# Patient Record
Sex: MALE | Race: WHITE | NOT HISPANIC OR LATINO | Employment: OTHER | URBAN - METROPOLITAN AREA
[De-identification: names, ages, dates, MRNs, and addresses within clinical notes are randomized per-mention and may not be internally consistent; named-entity substitution may affect disease eponyms.]

---

## 2017-10-26 DIAGNOSIS — E78.5 HYPERLIPIDEMIA: ICD-10-CM

## 2017-10-26 DIAGNOSIS — Z12.5 ENCOUNTER FOR SCREENING FOR MALIGNANT NEOPLASM OF PROSTATE: ICD-10-CM

## 2017-10-30 ENCOUNTER — ALLSCRIPTS OFFICE VISIT (OUTPATIENT)
Dept: OTHER | Facility: OTHER | Age: 77
End: 2017-10-30

## 2018-01-13 NOTE — MISCELLANEOUS
History of Present Illness  TCM Communication [facility]: The patient is being contacted for follow-up after hospitalization and NEEDS APPT, PT CANCELLED JOE  He was hospitalized at [facility]  The date of discharge: 1/11/16  Diagnosis: ACUTE PYELONEPHRITIS  Medications reviewed and updated today  He did not schedule a follow up appointment  Follow-up appointments with other specialists: UROLOGY AND CARDS F/U  Counseling was provided to patient's family  SPOKE TO WIFE, DOING FAIR, GETTING IV ABX AT HOME  Communication performed and completed by JOY MALHOTRA      Active Problems    1  Atrial fibrillation (427 31) (I48 91)   2  Cellulitis of leg (682 6) (L03 119)   3  Complete tear of rotator cuff, unspecified laterality (727 61) (M75 120)   4  Depression (311) (F32 9)   5  Edema (782 3) (R60 9)   6  Esophageal reflux (530 81) (K21 9)   7  Fatigue (780 79) (R53 83)   8  Generalized osteoarthritis (715 00) (M15 9)   9  Hyperlipidemia (272 4) (E78 5)   10  Hypertension (401 9) (I10)   11  Narcotic withdrawal (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,304 00) (F11 23)   12  Obesity (278 00) (E66 9)   13  Osteoarthritis of left knee, unspecified osteoarthritis type (715 96) (M17 9)   14  Osteomyelitis of foot (730 27) (M86 9)   15  Other chronic pain (338 29) (G89 29)   16  Palpitations (785 1) (R00 2)   17  Special screening examination for neoplasm of prostate (V76 44) (Z12 5)   18  Testicular dysfunction (257 9) (E29 9)   19  UTI (urinary tract infection) (599 0) (N39 0)   20  Venous stasis dermatitis (454 1) (I83 10)    Past Medical History    1  History of Acute Osteomyelitis Of The Ankle (730 07)   2  History of Foot ulcer, unspecified laterality, with unspecified severity (707 15) (L97 509)   3  History of fatigue (V13 89) (Z87 898)   4  History of Malaise (780 79) (R53 81)    Surgical History    1  History of Cholecystectomy   2  History of Foot Surgery Left   3  History of Foot Surgery Right   4  History of Hernia Repair   5   History of Knee Arthroplasty   6  History of Prophylactic Pinning Of The Femoral Neck And Proximal Femur   7  History of Tonsillectomy    Family History    1  Family history of Alcoholism    2  Family history of Alcoholism    3  Family history of Alcoholism    Social History    · Denied: History of Alcohol Use (History)   · Denied: History of Drug Use   · Former smoker (V15 82) (E23 881)   · Marital History - Currently    · Occupation: Retired   · Stopped Drinking Alcohol    Current Meds   1  Clotrimazole-Betamethasone 1-0 05 % External Cream; Apply to affected area twice a   day for 1 week; Therapy: 64OWR3023 to (Evaluate:12Jan2016)  Requested for: 57Rtu5691; Last   Rx:[id number] Ordered   2  Cymbalta 60 MG Oral Capsule Delayed Release Particles Recorded   3  Diltiazem HCl ER Beads 240 MG Oral Capsule Extended Release 24 Hour; TAKE 1   CAPSULE DAILY  Requested for: 78Nrh0121; Last Rx:[id number] Ordered   4  Lithium Carbonate  MG Oral Tablet Extended Release; Therapy: (Recorded:51Osh3039) to Recorded   5  Neurontin 800 MG Oral Tablet; TAKE 1 TABLET 4 TIMES DAILY; Therapy: (Recorded:12Hle7147) to Recorded   6  Omeprazole 20 MG Oral Capsule Delayed Release; Therapy: (Recorded:12Ccs3623) to Recorded   7  Robaxin TABS Recorded   8  Tylenol 325 MG Oral Tablet; Therapy: (Recorded:52Tbt5778) to Recorded   9  Warfarin Sodium 5 MG Oral Tablet; Therapy: (Recorded:87Kwl7910) to Recorded   10  Xarelto 20 MG Oral Tablet; take 1 tablet by mouth once daily; Therapy: 95FDS8366 to (Evaluate:20Mar2016)  Requested for: 76Hlb0166; Last    Rx:[id number] Ordered    Allergies    1  Penicillins    Future Appointments    Date/Time Provider Specialty Site   01/26/2016 10:45 AM YARY Burr   Cardiology  [address]     Signatures   Electron[facility] : [facility]; Jan 22 2016  4:14PM EST                       (Author)    Electronically signed by : YARY Holland ; Jan 26 2016 11: 59AM EST

## 2018-01-14 VITALS — DIASTOLIC BLOOD PRESSURE: 80 MMHG | HEART RATE: 61 BPM | SYSTOLIC BLOOD PRESSURE: 140 MMHG | OXYGEN SATURATION: 94 %

## 2018-02-15 DIAGNOSIS — I10 ESSENTIAL HYPERTENSION: Primary | ICD-10-CM

## 2018-02-15 RX ORDER — LISINOPRIL 2.5 MG/1
TABLET ORAL
Qty: 30 TABLET | Refills: 5 | Status: SHIPPED | OUTPATIENT
Start: 2018-02-15 | End: 2018-07-18 | Stop reason: SDUPTHER

## 2018-02-19 DIAGNOSIS — I10 ESSENTIAL HYPERTENSION: Primary | ICD-10-CM

## 2018-02-20 RX ORDER — DILTIAZEM HYDROCHLORIDE 240 MG/1
CAPSULE, EXTENDED RELEASE ORAL
Qty: 30 CAPSULE | Refills: 0 | Status: SHIPPED | OUTPATIENT
Start: 2018-02-20 | End: 2018-03-23 | Stop reason: SDUPTHER

## 2018-03-05 DIAGNOSIS — I48.91 ATRIAL FIBRILLATION, UNSPECIFIED TYPE (HCC): Primary | ICD-10-CM

## 2018-03-06 RX ORDER — RIVAROXABAN 20 MG/1
TABLET, FILM COATED ORAL
Qty: 30 TABLET | Refills: 2 | Status: SHIPPED | OUTPATIENT
Start: 2018-03-06 | End: 2018-06-12 | Stop reason: SDUPTHER

## 2018-03-23 DIAGNOSIS — I10 ESSENTIAL HYPERTENSION: ICD-10-CM

## 2018-03-23 RX ORDER — DILTIAZEM HYDROCHLORIDE 240 MG/1
CAPSULE, EXTENDED RELEASE ORAL
Qty: 30 CAPSULE | Refills: 0 | Status: SHIPPED | OUTPATIENT
Start: 2018-03-23 | End: 2018-04-27 | Stop reason: SDUPTHER

## 2018-04-27 DIAGNOSIS — I10 ESSENTIAL HYPERTENSION: ICD-10-CM

## 2018-04-27 RX ORDER — DILTIAZEM HYDROCHLORIDE 240 MG/1
CAPSULE, EXTENDED RELEASE ORAL
Qty: 30 CAPSULE | Refills: 0 | Status: SHIPPED | OUTPATIENT
Start: 2018-04-27 | End: 2018-05-29 | Stop reason: SDUPTHER

## 2018-05-09 ENCOUNTER — TELEPHONE (OUTPATIENT)
Dept: INTERNAL MEDICINE CLINIC | Facility: CLINIC | Age: 78
End: 2018-05-09

## 2018-05-11 ENCOUNTER — TELEPHONE (OUTPATIENT)
Dept: INTERNAL MEDICINE CLINIC | Facility: CLINIC | Age: 78
End: 2018-05-11

## 2018-05-11 NOTE — TELEPHONE ENCOUNTER
PT is requesting to go there for respit  (6/23/18-7/22), and they need info on him, to see if they can accept him or not  She faxed request for H&P and other info ()  She rec'ed some info, but still needs H&P  Also needs to know his cognitive status    Does he have any wounds    How does he transfer ???      Fax

## 2018-05-16 DIAGNOSIS — B35.9 TINEA: Primary | ICD-10-CM

## 2018-05-17 RX ORDER — CLOTRIMAZOLE AND BETAMETHASONE DIPROPIONATE 10; .64 MG/G; MG/G
CREAM TOPICAL
Qty: 45 G | Refills: 2 | Status: SHIPPED | OUTPATIENT
Start: 2018-05-17 | End: 2018-09-11 | Stop reason: SDUPTHER

## 2018-05-29 DIAGNOSIS — I10 ESSENTIAL HYPERTENSION: ICD-10-CM

## 2018-05-29 RX ORDER — DILTIAZEM HYDROCHLORIDE 240 MG/1
CAPSULE, EXTENDED RELEASE ORAL
Qty: 30 CAPSULE | Refills: 0 | Status: SHIPPED | OUTPATIENT
Start: 2018-05-29 | End: 2018-06-19 | Stop reason: SDUPTHER

## 2018-06-12 DIAGNOSIS — I48.91 ATRIAL FIBRILLATION, UNSPECIFIED TYPE (HCC): ICD-10-CM

## 2018-06-12 RX ORDER — RIVAROXABAN 20 MG/1
TABLET, FILM COATED ORAL
Qty: 30 TABLET | Refills: 2 | Status: SHIPPED | OUTPATIENT
Start: 2018-06-12 | End: 2018-09-11 | Stop reason: SDUPTHER

## 2018-06-19 DIAGNOSIS — I10 ESSENTIAL HYPERTENSION: ICD-10-CM

## 2018-06-19 NOTE — TELEPHONE ENCOUNTER
Patient is going out of town tomorrow and the pharmacist asked if this medication could be refilled today!!!

## 2018-06-20 RX ORDER — DILTIAZEM HYDROCHLORIDE 240 MG/1
CAPSULE, EXTENDED RELEASE ORAL
Qty: 30 CAPSULE | Refills: 0 | Status: SHIPPED | OUTPATIENT
Start: 2018-06-20 | End: 2018-07-18 | Stop reason: SDUPTHER

## 2018-07-18 DIAGNOSIS — I10 ESSENTIAL HYPERTENSION: ICD-10-CM

## 2018-07-18 RX ORDER — DILTIAZEM HYDROCHLORIDE 240 MG/1
CAPSULE, EXTENDED RELEASE ORAL
Qty: 30 CAPSULE | Refills: 0 | Status: SHIPPED | OUTPATIENT
Start: 2018-07-18 | End: 2018-08-31 | Stop reason: SDUPTHER

## 2018-07-18 RX ORDER — LISINOPRIL 2.5 MG/1
TABLET ORAL
Qty: 30 TABLET | Refills: 5 | Status: SHIPPED | OUTPATIENT
Start: 2018-07-18 | End: 2019-02-10 | Stop reason: SDUPTHER

## 2018-08-31 DIAGNOSIS — I10 ESSENTIAL HYPERTENSION: ICD-10-CM

## 2018-09-04 RX ORDER — DILTIAZEM HYDROCHLORIDE 240 MG/1
CAPSULE, EXTENDED RELEASE ORAL
Qty: 30 CAPSULE | Refills: 0 | Status: SHIPPED | OUTPATIENT
Start: 2018-09-04 | End: 2018-10-06 | Stop reason: SDUPTHER

## 2018-09-11 DIAGNOSIS — I48.91 ATRIAL FIBRILLATION, UNSPECIFIED TYPE (HCC): ICD-10-CM

## 2018-09-11 DIAGNOSIS — B35.9 TINEA: ICD-10-CM

## 2018-09-11 RX ORDER — RIVAROXABAN 20 MG/1
TABLET, FILM COATED ORAL
Qty: 30 TABLET | Refills: 2 | Status: SHIPPED | OUTPATIENT
Start: 2018-09-11 | End: 2018-12-17 | Stop reason: SDUPTHER

## 2018-09-11 RX ORDER — CLOTRIMAZOLE AND BETAMETHASONE DIPROPIONATE 10; .64 MG/G; MG/G
CREAM TOPICAL
Qty: 45 G | Refills: 2 | Status: SHIPPED | OUTPATIENT
Start: 2018-09-11 | End: 2019-01-08 | Stop reason: SDUPTHER

## 2018-10-06 DIAGNOSIS — I10 ESSENTIAL HYPERTENSION: ICD-10-CM

## 2018-10-08 RX ORDER — DILTIAZEM HYDROCHLORIDE 240 MG/1
CAPSULE, EXTENDED RELEASE ORAL
Qty: 30 CAPSULE | Refills: 0 | Status: SHIPPED | OUTPATIENT
Start: 2018-10-08 | End: 2018-12-17 | Stop reason: SDUPTHER

## 2018-12-17 DIAGNOSIS — I48.91 ATRIAL FIBRILLATION, UNSPECIFIED TYPE (HCC): ICD-10-CM

## 2018-12-17 DIAGNOSIS — I10 ESSENTIAL HYPERTENSION: ICD-10-CM

## 2018-12-17 RX ORDER — RIVAROXABAN 20 MG/1
TABLET, FILM COATED ORAL
Qty: 30 TABLET | Refills: 2 | OUTPATIENT
Start: 2018-12-17

## 2018-12-17 RX ORDER — DILTIAZEM HYDROCHLORIDE 240 MG/1
CAPSULE, EXTENDED RELEASE ORAL
Qty: 30 CAPSULE | Refills: 0 | OUTPATIENT
Start: 2018-12-17

## 2018-12-17 RX ORDER — DILTIAZEM HYDROCHLORIDE 240 MG/1
240 CAPSULE, COATED, EXTENDED RELEASE ORAL DAILY
Qty: 30 CAPSULE | Refills: 0 | Status: SHIPPED | OUTPATIENT
Start: 2018-12-17 | End: 2019-01-18 | Stop reason: SDUPTHER

## 2018-12-17 NOTE — TELEPHONE ENCOUNTER
Left detailed message informing patient that he needs to schedule an appointment before getting any refills

## 2018-12-17 NOTE — TELEPHONE ENCOUNTER
Pt called back and scheduled an appt for 1/8/19 with Jaynesong Medina  Wants to know if he could have a refill to hold him over until his appt   He cannot come in earlier due to the holidays and travel     Send to [facility] [address]     Any questions call pt   [phone]

## 2019-01-07 ENCOUNTER — TELEPHONE (OUTPATIENT)
Dept: INTERNAL MEDICINE CLINIC | Facility: CLINIC | Age: 79
End: 2019-01-07

## 2019-01-07 NOTE — TELEPHONE ENCOUNTER
Patient had to cancel his appt with Elvia Tanner    He was in hosp and is home now    But not well enough to come in for the appt  and wants to speak to Elvia Tanner about this    Asked that Elvia Tanner please call him back  Paco Dow

## 2019-01-07 NOTE — TELEPHONE ENCOUNTER
I spoke to the patient he has been hospitalized twice with infections and sepsis in the last 3 months  At this point he is having trouble getting ambulance transportation to come to the office I told him I will refill his medicines until he is able to get into wheelchair and come into the the office    He was hospitalized at [facility] we have no access to his records

## 2019-01-08 DIAGNOSIS — B35.9 TINEA: ICD-10-CM

## 2019-01-08 RX ORDER — CLOTRIMAZOLE AND BETAMETHASONE DIPROPIONATE 10; .64 MG/G; MG/G
CREAM TOPICAL
Qty: 45 G | Refills: 2 | Status: SHIPPED | OUTPATIENT
Start: 2019-01-08

## 2019-01-18 DIAGNOSIS — I48.91 ATRIAL FIBRILLATION, UNSPECIFIED TYPE (HCC): ICD-10-CM

## 2019-01-18 DIAGNOSIS — I10 ESSENTIAL HYPERTENSION: ICD-10-CM

## 2019-01-18 RX ORDER — DILTIAZEM HYDROCHLORIDE 240 MG/1
240 CAPSULE, COATED, EXTENDED RELEASE ORAL DAILY
Qty: 30 CAPSULE | Refills: 0 | Status: SHIPPED | OUTPATIENT
Start: 2019-01-18 | End: 2019-02-13 | Stop reason: SDUPTHER

## 2019-01-18 RX ORDER — DILTIAZEM HYDROCHLORIDE 240 MG/1
CAPSULE, EXTENDED RELEASE ORAL
Qty: 30 CAPSULE | Refills: 0 | OUTPATIENT
Start: 2019-01-18

## 2019-01-23 ENCOUNTER — TELEPHONE (OUTPATIENT)
Dept: INTERNAL MEDICINE CLINIC | Facility: CLINIC | Age: 79
End: 2019-01-23

## 2019-02-10 DIAGNOSIS — I10 ESSENTIAL HYPERTENSION: ICD-10-CM

## 2019-02-11 RX ORDER — LISINOPRIL 2.5 MG/1
TABLET ORAL
Qty: 30 TABLET | Refills: 5 | Status: SHIPPED | OUTPATIENT
Start: 2019-02-11

## 2019-02-13 DIAGNOSIS — I10 ESSENTIAL HYPERTENSION: ICD-10-CM

## 2019-02-13 RX ORDER — DILTIAZEM HYDROCHLORIDE 240 MG/1
CAPSULE, EXTENDED RELEASE ORAL
Qty: 30 CAPSULE | Refills: 0 | Status: SHIPPED | OUTPATIENT
Start: 2019-02-13 | End: 2019-03-05 | Stop reason: SDUPTHER

## 2019-02-22 DIAGNOSIS — I48.91 ATRIAL FIBRILLATION, UNSPECIFIED TYPE (HCC): ICD-10-CM

## 2019-02-22 RX ORDER — RIVAROXABAN 20 MG/1
TABLET, FILM COATED ORAL
Qty: 30 TABLET | Refills: 0 | Status: SHIPPED | OUTPATIENT
Start: 2019-02-22

## 2019-03-05 ENCOUNTER — CONSULT (OUTPATIENT)
Dept: INTERNAL MEDICINE CLINIC | Facility: CLINIC | Age: 79
End: 2019-03-05
Payer: MEDICARE

## 2019-03-05 ENCOUNTER — TELEPHONE (OUTPATIENT)
Dept: INTERNAL MEDICINE CLINIC | Facility: CLINIC | Age: 79
End: 2019-03-05

## 2019-03-05 VITALS — HEART RATE: 70 BPM | DIASTOLIC BLOOD PRESSURE: 84 MMHG | SYSTOLIC BLOOD PRESSURE: 118 MMHG | OXYGEN SATURATION: 100 %

## 2019-03-05 DIAGNOSIS — F34.1 DYSTHYMIA: ICD-10-CM

## 2019-03-05 DIAGNOSIS — Z01.818 PREOP EXAM FOR INTERNAL MEDICINE: ICD-10-CM

## 2019-03-05 DIAGNOSIS — E78.2 MIXED HYPERLIPIDEMIA: ICD-10-CM

## 2019-03-05 DIAGNOSIS — I48.0 PAROXYSMAL ATRIAL FIBRILLATION (HCC): ICD-10-CM

## 2019-03-05 DIAGNOSIS — I48.0 PAROXYSMAL ATRIAL FIBRILLATION (HCC): Primary | ICD-10-CM

## 2019-03-05 DIAGNOSIS — I10 ESSENTIAL HYPERTENSION: ICD-10-CM

## 2019-03-05 DIAGNOSIS — I10 ESSENTIAL HYPERTENSION: Primary | ICD-10-CM

## 2019-03-05 DIAGNOSIS — N39.0 RECURRENT UTI: ICD-10-CM

## 2019-03-05 DIAGNOSIS — I48.91 ATRIAL FIBRILLATION, UNSPECIFIED TYPE (HCC): ICD-10-CM

## 2019-03-05 PROBLEM — T78.40XA ALLERGIC REACTION CAUSED BY A DRUG: Status: ACTIVE | Noted: 2019-03-05

## 2019-03-05 PROCEDURE — 99215 OFFICE O/P EST HI 40 MIN: CPT | Performed by: INTERNAL MEDICINE

## 2019-03-05 RX ORDER — ACETAMINOPHEN 325 MG/1
TABLET ORAL
COMMUNITY

## 2019-03-05 RX ORDER — METHOCARBAMOL 750 MG/1
1500 TABLET, FILM COATED ORAL EVERY 12 HOURS
Refills: 0 | COMMUNITY
Start: 2019-02-04

## 2019-03-05 RX ORDER — IBUPROFEN 600 MG/1
1200 TABLET ORAL 3 TIMES DAILY
Refills: 0 | COMMUNITY
Start: 2019-02-04

## 2019-03-05 RX ORDER — DULOXETIN HYDROCHLORIDE 60 MG/1
CAPSULE, DELAYED RELEASE ORAL
COMMUNITY

## 2019-03-05 RX ORDER — BUPROPION HYDROCHLORIDE 150 MG/1
300 TABLET ORAL DAILY
Refills: 0 | COMMUNITY
Start: 2019-01-11

## 2019-03-05 RX ORDER — DILTIAZEM HYDROCHLORIDE 300 MG/1
300 CAPSULE, COATED, EXTENDED RELEASE ORAL DAILY
Qty: 30 CAPSULE | Refills: 1 | Status: SHIPPED | OUTPATIENT
Start: 2019-03-05

## 2019-03-05 RX ORDER — GABAPENTIN 600 MG/1
TABLET ORAL
COMMUNITY
Start: 2019-02-17

## 2019-03-05 RX ORDER — METHENAMINE HIPPURATE 1000 MG/1
TABLET ORAL
COMMUNITY
Start: 2019-02-06 | End: 2019-03-05

## 2019-03-05 NOTE — PROGRESS NOTES
Assessment/Plan:    Recurrent UTI  Agree with plan for percutaneous nephrostomy     Atrial fibrillation (HCC)  Hold xarelto 3 days prior to surgery and restart 24-48 hrs post op depending on clinical status  Hypertension  Well controlled  Continue with cartia xt     Depression  Currently on cymbalta and wellbutrin  For depression without SI or HI     Hyperlipidemia  Off meds and hasnt been evaluated in a long time  Will order lipid panel     Allergic reaction caused by a drug  Methenamine dc'd    Preop exam for internal medicine  I have seen and examined the patient and reviewed his medical hx and examined  Preoperative studies were not available to me at this visit  My PA will review the studies and if they are wnl I see no medical contraindication for nephrostomy tube placement  Diagnoses and all orders for this visit:    Essential hypertension    Paroxysmal atrial fibrillation ([address] )    Preop exam for internal medicine    Mixed hyperlipidemia    Dysthymia    Recurrent UTI    Other orders  -     acetaminophen (TYLENOL) 325 mg tablet; Take by mouth  -     buPROPion (WELLBUTRIN XL) 150 mg 24 hr tablet; Take 300 mg by mouth daily  -     DULoxetine (CYMBALTA) 60 mg delayed release capsule; Take by mouth  -     gabapentin (NEURONTIN) 600 MG tablet  -     ibuprofen (MOTRIN) 600 mg tablet; Take 1,200 mg by mouth 3 (three) times a day  -     methocarbamol (ROBAXIN) 750 mg tablet; Take 1,500 mg by mouth every 12 (twelve) hours  -     Discontinue: methenamine hippurate (HIPREX) 1 g tablet          Subjective:      Patient ID: Juan Jose Black is a 70 y o  male  Patient presents on referral from Dr Norberto Marie for preoperative evaluation for percutaneous nephrostomy placement  He has had problems with recurrent urinary tract infections and urosepsis which was attributed to his kidney stones  He is not a candidate for lithotripsy for this condition   He was started on methenamine by Dr Shu Porras and developed a pruritic rash  He stopped it and it his rash has slowly improved      The following portions of the patient's history were reviewed and updated as appropriate: allergies, current medications, past family history, past medical history, past social history, past surgical history and problem list     Review of Systems   Constitutional: Negative for activity change, appetite change, chills, diaphoresis, fatigue, fever and unexpected weight change  HENT: Negative for congestion, dental problem, drooling, ear discharge, ear pain, facial swelling, hearing loss, mouth sores, nosebleeds, postnasal drip, rhinorrhea, sinus pressure, sinus pain, sneezing, sore throat, tinnitus, trouble swallowing and voice change  Eyes: Negative for photophobia, pain, discharge, redness, itching and visual disturbance  Respiratory: Negative for apnea, cough, choking, chest tightness, shortness of breath, wheezing and stridor  Cardiovascular: Negative for chest pain, palpitations and leg swelling  Gastrointestinal: Negative for abdominal distention, abdominal pain, anal bleeding, blood in stool, constipation, diarrhea, nausea, rectal pain and vomiting  Endocrine: Negative for cold intolerance, heat intolerance, polydipsia, polyphagia and polyuria  Genitourinary: Positive for dysuria  Negative for decreased urine volume, difficulty urinating, enuresis, flank pain, frequency, genital sores, hematuria and urgency  Occasional urinary incontinence   Musculoskeletal: Positive for back pain  Negative for arthralgias, gait problem, joint swelling, myalgias, neck pain and neck stiffness  Skin: Positive for rash  Negative for color change, pallor and wound  Allergic/Immunologic: Negative for environmental allergies, food allergies and immunocompromised state  Neurological: Negative for dizziness, tremors, seizures, syncope, facial asymmetry, speech difficulty, weakness, light-headedness, numbness and headaches  Hematological: Negative for adenopathy  Does not bruise/bleed easily  Psychiatric/Behavioral: Negative for agitation, self-injury, sleep disturbance and suicidal ideas  The patient is not nervous/anxious  pmhx  Obesity  afib  htn  Hyperlipidemia  Depression  Recurrent uti  Kidney stones    pshx  Umbilical and inguinal hernia repair  t & A  cholescystectomy  Nephrostomy tube placement  Right tkr  Hip surgery for dysplasia  Amputation of toe on the left foot due to osteomyelitis  Skin grafts    Family hx: NC  Social hx:   Retired  Disabled  Smoked 2ppd for 9 yrs and stopped 50 yrs ago  Abstinent of alcohol  No recreational drug use  Objective:       /84   Pulse 70   SpO2 100%          Physical Exam   Constitutional: He is oriented to person, place, and time  He appears well-developed and well-nourished  No distress  HENT:   Head: Normocephalic and atraumatic  Right Ear: External ear normal    Left Ear: External ear normal    Mouth/Throat: Oropharynx is clear and moist    Eyes: Pupils are equal, round, and reactive to light  Conjunctivae and EOM are normal  No scleral icterus  Neck: Normal range of motion  Neck supple  No JVD present  No tracheal deviation present  No thyromegaly present  Cardiovascular: Normal rate, regular rhythm and intact distal pulses  Exam reveals no gallop and no friction rub  No murmur heard  Pulmonary/Chest: Effort normal and breath sounds normal  No respiratory distress  He has no wheezes  He has no rales  He exhibits no tenderness  Abdominal: Soft  Bowel sounds are normal  He exhibits no distension and no mass  There is no tenderness  There is no rebound and no guarding  Musculoskeletal: Normal range of motion  He exhibits tenderness  He exhibits no edema or deformity  Lymphadenopathy:     He has no cervical adenopathy  Neurological: He is alert and oriented to person, place, and time  He has normal reflexes  No cranial nerve deficit  Coordination normal    Skin: Skin is warm and dry  Rash noted  He is not diaphoretic  No erythema  No pallor  Psychiatric: He has a normal mood and affect   His behavior is normal  Judgment and thought content normal

## 2019-03-05 NOTE — ASSESSMENT & PLAN NOTE
I have seen and examined the patient and reviewed his medical hx and examined  Preoperative studies were not available to me at this visit  My PA will review the studies and if they are wnl I see no medical contraindication for nephrostomy tube placement  Addendum    I reviewed his ekg and he is in afib with a vr of 123  Will increase cardizem to 300 mg  He will need to be rate controlled preoperatively   Will arrange for Cardiology eval  His surgery will have to be postponed

## 2019-03-06 ENCOUNTER — TELEPHONE (OUTPATIENT)
Dept: CARDIOLOGY CLINIC | Facility: CLINIC | Age: 79
End: 2019-03-06

## 2019-03-06 NOTE — PROGRESS NOTES
Addendum:  Per Dr Henrique Dowd, The pt's EKG showed a ventricular rate of 123  The pt will need further heart rate control and a cardiology consult before he can proceed with the planned procedure  We cannot clear him at this time for surgery next week

## 2019-03-12 ENCOUNTER — CONSULT (OUTPATIENT)
Dept: CARDIOLOGY CLINIC | Facility: CLINIC | Age: 79
End: 2019-03-12
Payer: MEDICARE

## 2019-03-12 VITALS — OXYGEN SATURATION: 96 % | SYSTOLIC BLOOD PRESSURE: 120 MMHG | HEART RATE: 62 BPM | DIASTOLIC BLOOD PRESSURE: 74 MMHG

## 2019-03-12 DIAGNOSIS — E78.2 MIXED HYPERLIPIDEMIA: ICD-10-CM

## 2019-03-12 DIAGNOSIS — I48.0 PAROXYSMAL ATRIAL FIBRILLATION (HCC): ICD-10-CM

## 2019-03-12 DIAGNOSIS — I10 ESSENTIAL HYPERTENSION: ICD-10-CM

## 2019-03-12 DIAGNOSIS — Z79.01 ANTICOAGULANT LONG-TERM USE: ICD-10-CM

## 2019-03-12 DIAGNOSIS — Z01.810 PREOP CARDIOVASCULAR EXAM: Primary | ICD-10-CM

## 2019-03-12 PROCEDURE — 99204 OFFICE O/P NEW MOD 45 MIN: CPT | Performed by: INTERNAL MEDICINE

## 2019-03-12 NOTE — PROGRESS NOTES
CARDIOLOGY OFFICE VISIT  [facility]  [address], [address] [facility], [facility] [place] Gaitan  Tel: [phone]      NAME: Ellery Bence  AGE: 70 y o  SEX: male  : 1940   MRN: [id number]      Chief Complaint:  Chief Complaint   Patient presents with    Pre-op Exam     cardiac risk assessment - kidney stones w/Dr Juan Nix          History of Present Illness:   Ref by Dr Genesis Stout  Patient comes for preop cardiac evaluation prior to left kidney nephrolithiasis surgery  He has had atrial fibrillation for many years but does not follow with Cardiology  Patient is wheelchair bound, hence exertional capacity is difficult to evaluate  Patient denies chest pain, palpitations, lightheadedness, syncope, orthopnea  PAF - on Cardizem for rate control and Xarelto for anticoagulation  Denies bleeding from any site  No recent cardiac workup done    HTN -  Has been hypertensive for many years  Taking medications regularly  Denies lightheadedness, headache, medication side effects  HLP -  Has had hyperlipidemia for many years  Taking statin regularly along with diet control  Denies myalgia  PCP closely monitoring the blood work        Past Medical History:  Past Medical History:   Diagnosis Date    Malaise          Past Surgical History:  Past Surgical History:   Procedure Laterality Date    CHOLECYSTECTOMY      FOOT SURGERY Left     FOOT SURGERY Right     HERNIA REPAIR      KNEE ARTHROPLASTY      PROPHYLACTIC NAILING / PINNING / PLATING / Myna Coffer / ULNA      TONSILLECTOMY           Family History:  Family History   Problem Relation Age of Onset    Alcohol abuse Mother     Alcohol abuse Father     Alcohol abuse Sister     No Known Problems Brother     No Known Problems Maternal Grandmother     No Known Problems Maternal Grandfather     No Known Problems Paternal Grandmother     No Known Problems Paternal Grandfather Social History:  Social History     Socioeconomic History    Marital status: /Civil Union     Spouse name: Not on file    Number of children: Not on file    Years of education: Not on file    Highest education level: Not on file   Occupational History    Occupation: Retired    Social Needs    Financial resource strain: Not on file    Food insecurity:     Worry: Not on file     Inability: Not on file   [place] needs:     Medical: Not on file     Non-medical: Not on file   Tobacco Use    Smoking status: Former Smoker   Substance and Sexual Activity    Alcohol use: No     Comment: Stopped drinking alcohol     Drug use: No    Sexual activity: Not on file   Lifestyle    Physical activity:     Days per week: Not on file     Minutes per session: Not on file    Stress: Not on file   Relationships    Social connections:     Talks on phone: Not on file     Gets together: Not on file     Attends Ca[employer]ism service: Not on file     Active member of club or organization: Not on file     Attends meetings of clubs or organizations: Not on file     Relationship status: Not on file    Intimate partner violence:     Fear of current or ex partner: Not on file     Emotionally abused: Not on file     Physically abused: Not on file     Forced sexual activity: Not on file   Other Topics Concern    Not on file   Social History Narrative    No advance directive on file          Active Problems:  Patient Active Problem List   Diagnosis    Abnormal alkaline phosphatase test    Atrial fibrillation ([facility] [address] )    Complete tear of rotator cuff    Depression    Esophageal reflux    Generalized osteoarthritis    Hyperlipidemia    Hypertension    Obesity    Testicular dysfunction    Preop exam for internal medicine    Recurrent UTI    Allergic reaction caused by a drug         The following portions of the patient's history were reviewed and updated as appropriate: past medical history, past surgical history, past family history,  past social history, current medications, allergies and problem list       Review of Systems:  Constitutional: Denies fever, chills, fatigue  Eyes: Denies eye redness, eye discharge, double vision  ENT: Denies hearing loss, sneezing, nasal discharge, sore throat   Respiratory: Denies cough, expectoration, hemoptysis  Cardiovascular: Denies chest pain, palpitations, orthopnea, PND  Gastrointestinal: Denies abdominal pain, nausea, vomiting, hematemesis, diarrhea, bloody stools  Genito-Urinary: Denies dysuria, incontinence  Neurologic: Denies confusion, lightheadedness, syncope, seizures  Endocrine: Denies polyuria, polydipsia, temperature intolerance  Allergy and Immunology: Denies hives, insect bite sensitivity  Hematological and Lymphatic: Denies bleeding problems, swollen glands   Psychological: Denies depression, suicidal ideation, anxiety, panic  Dermatological: Denies pruritus, rash, skin lesion changes      Vitals:  Vitals:    03/12/19 1322   BP: 120/74   Pulse: 62   SpO2: 96%       There is no height or weight on file to calculate BMI  Weight (last 2 days)     Date/Time   Weight    03/12/19 1322   -- patient in wheelchair    Weight: patient in wheelchair at 03/12/19 1322                Physical Examination:  General:  Examined in a wheelchair  Patient is not in acute distress  Awake, alert, oriented in time, place and person  Responding to commands  Head: Normocephalic  Atraumatic  Eyes: Nonicteric  ENT: Normal external ear canals  Nares normal, no drainage  Lips and oral mucosa normal  Neck: Supple  JVP not raised  Trachea central  No thyromegaly  Lungs: Bilateral bronchovascular breath sounds with no crackles or rhonchi  Chest wall: No tenderness  Cardiovascular: RRR  S1 and S2 normal  No murmur, rub or gallop  Gastrointestinal: Abdomen soft, nontender  Bowel sounds present  Neurologic: Patient is awake, alert, oriented in time, place and person  Responding to command  Integumentary:  No skin rash  Lymphatic: No cervical lymphadenopathy  Back: Symmetric  No CVA tenderness  Extremities: Left forefoot amputated   Right foot few toes amputated      Laboratory Results:  CBC with diff:   Lab Results   Component Value Date    WBC 12 23 (H) 07/21/2016    RBC 5 83 (H) 07/21/2016    HGB 16 7 07/21/2016    HCT 49 5 (H) 07/21/2016    MCV 85 07/21/2016    MCH 28 6 07/21/2016    RDW 15 8 (H) 07/21/2016     07/21/2016       CMP:  Lab Results   Component Value Date    CREATININE 0 98 07/21/2016    BUN 20 07/21/2016    K 4 1 07/21/2016     07/21/2016    CO2 20 (L) 07/21/2016    ALKPHOS 143 (H) 07/21/2016    ALT 30 07/21/2016    AST 31 07/21/2016       Lipid Profile:   No results found for: CHOL  Lab Results   Component Value Date    HDL 41 07/21/2016     Lab Results   Component Value Date    LDLCALC 101 (H) 07/21/2016     Lab Results   Component Value Date    TRIG 176 (H) 07/21/2016       Medications:    Current Outpatient Medications:     acetaminophen (TYLENOL) 325 mg tablet, Take by mouth, Disp: , Rfl:     buPROPion (WELLBUTRIN XL) 150 mg 24 hr tablet, Take 300 mg by mouth daily, Disp: , Rfl: 0    clotrimazole-betamethasone (LOTRISONE) 1-0 05 % cream, APPLY TO AFFECTED AREA TWICE DAILY  FOR 1 WEEK, Disp: 45 g, Rfl: 2    diltiazem (CARDIZEM CD) 300 mg 24 hr capsule, Take 1 capsule (300 mg total) by mouth daily, Disp: 30 capsule, Rfl: 1    DULoxetine (CYMBALTA) 60 mg delayed release capsule, Take by mouth, Disp: , Rfl:     gabapentin (NEURONTIN) 600 MG tablet, , Disp: , Rfl:     ibuprofen (MOTRIN) 600 mg tablet, Take 1,200 mg by mouth 3 (three) times a day, Disp: , Rfl: 0    lisinopril (ZESTRIL) 2 5 mg tablet, take 1 tablet by mouth once daily, Disp: 30 tablet, Rfl: 5    methocarbamol (ROBAXIN) 750 mg tablet, Take 1,500 mg by mouth every 12 (twelve) hours, Disp: , Rfl: 0    XARELTO 20 MG tablet, take 1 tablet by mouth once daily, Disp: 30 tablet, Rfl: 0      Allergies: Allergies   Allergen Reactions    Methenamine     Penicillins Other (See Comments)     ? ? Assessment and Plan:  1  Preop cardiovascular exam  Recent EKG reviewed  Check 2D echocardiogram for EF, RWMA    2  Paroxysmal atrial fibrillation (HCC)  Continue diltiazem for rate control and Xarelto for anticoagulation    3  Anticoagulant long-term use  Continue Xarelto  Denies bleeding from any site    4  Essential hypertension  BP stable  Continue current medications    5  Mixed hyperlipidemia  Continue diet control    Recommend aggressive risk factor modification and therapeutic lifestyle changes  Low-salt, low-calorie, low-fat, low-cholesterol diet and to optimize weight  I will defer the ordering and monitoring of necessity lab studies to you, but I am available and happy to review and manage any of the data at your request in the future  Discussed concepts of atherosclerosis, including signs and symptoms of cardiac disease  Previous studies were reviewed  Safety measures were reviewed  Questions were entertained and answered  Patient was advised to report any problems requiring medical attention  Follow-up with PCP and appropriate specialist and lab work as discussed  Return for follow up visit as scheduled or earlier, if needed  Thank you for allowing me to participate in the care and evaluation of your patient  Should you have any questions, please feel free to contact me        Jessica Villarreal MD  3/12/2019,1:57 PM

## 2019-03-14 ENCOUNTER — TELEPHONE (OUTPATIENT)
Dept: INTERNAL MEDICINE CLINIC | Facility: CLINIC | Age: 79
End: 2019-03-14

## 2019-03-15 ENCOUNTER — TELEPHONE (OUTPATIENT)
Dept: INTERNAL MEDICINE CLINIC | Facility: CLINIC | Age: 79
End: 2019-03-15

## 2019-03-15 NOTE — TELEPHONE ENCOUNTER
Pts wife cld to let Dr Zulema Homans know that the pt passed away last night at [address] [place] [address]

## 2019-03-22 ENCOUNTER — TELEPHONE (OUTPATIENT)
Dept: CARDIOLOGY CLINIC | Facility: CLINIC | Age: 79
End: 2019-03-22
